# Patient Record
Sex: MALE | Race: WHITE
[De-identification: names, ages, dates, MRNs, and addresses within clinical notes are randomized per-mention and may not be internally consistent; named-entity substitution may affect disease eponyms.]

---

## 2017-06-07 ENCOUNTER — HOSPITAL ENCOUNTER (OUTPATIENT)
Dept: HOSPITAL 62 - ER | Age: 57
Setting detail: OBSERVATION
LOS: 3 days | Discharge: HOME | End: 2017-06-10
Attending: SURGERY
Payer: COMMERCIAL

## 2017-06-07 DIAGNOSIS — Z90.49: ICD-10-CM

## 2017-06-07 DIAGNOSIS — Z92.3: ICD-10-CM

## 2017-06-07 DIAGNOSIS — Z92.21: ICD-10-CM

## 2017-06-07 DIAGNOSIS — Z80.9: ICD-10-CM

## 2017-06-07 DIAGNOSIS — Z85.048: ICD-10-CM

## 2017-06-07 DIAGNOSIS — Z85.038: ICD-10-CM

## 2017-06-07 DIAGNOSIS — K50.00: Primary | ICD-10-CM

## 2017-06-07 DIAGNOSIS — K76.0: ICD-10-CM

## 2017-06-07 LAB
ALBUMIN SERPL-MCNC: 4.7 G/DL (ref 3.5–5)
ALP SERPL-CCNC: 76 U/L (ref 38–126)
ALT SERPL-CCNC: 52 U/L (ref 21–72)
ANION GAP SERPL CALC-SCNC: 13 MMOL/L (ref 5–19)
APPEARANCE UR: CLEAR
AST SERPL-CCNC: 31 U/L (ref 17–59)
BASOPHILS # BLD AUTO: 0 10^3/UL (ref 0–0.2)
BASOPHILS NFR BLD AUTO: 0.3 % (ref 0–2)
BILIRUB DIRECT SERPL-MCNC: 0.2 MG/DL (ref 0–0.4)
BILIRUB SERPL-MCNC: 0.9 MG/DL (ref 0.2–1.3)
BILIRUB UR QL STRIP: NEGATIVE
BUN SERPL-MCNC: 28 MG/DL (ref 7–20)
CALCIUM: 10 MG/DL (ref 8.4–10.2)
CHLORIDE SERPL-SCNC: 106 MMOL/L (ref 98–107)
CO2 SERPL-SCNC: 24 MMOL/L (ref 22–30)
CREAT SERPL-MCNC: 0.79 MG/DL (ref 0.52–1.25)
EOSINOPHIL # BLD AUTO: 0 10^3/UL (ref 0–0.6)
EOSINOPHIL NFR BLD AUTO: 0.1 % (ref 0–6)
ERYTHROCYTE [DISTWIDTH] IN BLOOD BY AUTOMATED COUNT: 14.1 % (ref 11.5–14)
GLUCOSE SERPL-MCNC: 93 MG/DL (ref 75–110)
GLUCOSE UR STRIP-MCNC: NEGATIVE MG/DL
HCT VFR BLD CALC: 48.9 % (ref 37.9–51)
HGB BLD-MCNC: 16.3 G/DL (ref 13.5–17)
HGB HCT DIFFERENCE: 0
KETONES UR STRIP-MCNC: NEGATIVE MG/DL
LIPASE SERPL-CCNC: 148.1 U/L (ref 23–300)
LYMPHOCYTES # BLD AUTO: 1.3 10^3/UL (ref 0.5–4.7)
LYMPHOCYTES NFR BLD AUTO: 9 % (ref 13–45)
MCH RBC QN AUTO: 28.6 PG (ref 27–33.4)
MCHC RBC AUTO-ENTMCNC: 33.4 G/DL (ref 32–36)
MCV RBC AUTO: 86 FL (ref 80–97)
MONOCYTES # BLD AUTO: 1 10^3/UL (ref 0.1–1.4)
MONOCYTES NFR BLD AUTO: 7.1 % (ref 3–13)
NEUTROPHILS # BLD AUTO: 11.8 10^3/UL (ref 1.7–8.2)
NEUTS SEG NFR BLD AUTO: 83.5 % (ref 42–78)
NITRITE UR QL STRIP: NEGATIVE
PH UR STRIP: 5 [PH] (ref 5–9)
POTASSIUM SERPL-SCNC: 4.4 MMOL/L (ref 3.6–5)
PROT SERPL-MCNC: 7.9 G/DL (ref 6.3–8.2)
PROT UR STRIP-MCNC: NEGATIVE MG/DL
RBC # BLD AUTO: 5.71 10^6/UL (ref 4.35–5.55)
SODIUM SERPL-SCNC: 142.7 MMOL/L (ref 137–145)
SP GR UR STRIP: 1.03
UROBILINOGEN UR-MCNC: NEGATIVE MG/DL (ref ?–2)
WBC # BLD AUTO: 14.1 10^3/UL (ref 4–10.5)

## 2017-06-07 PROCEDURE — 96361 HYDRATE IV INFUSION ADD-ON: CPT

## 2017-06-07 PROCEDURE — 81001 URINALYSIS AUTO W/SCOPE: CPT

## 2017-06-07 PROCEDURE — 85025 COMPLETE CBC W/AUTO DIFF WBC: CPT

## 2017-06-07 PROCEDURE — 99285 EMERGENCY DEPT VISIT HI MDM: CPT

## 2017-06-07 PROCEDURE — 96375 TX/PRO/DX INJ NEW DRUG ADDON: CPT

## 2017-06-07 PROCEDURE — 83690 ASSAY OF LIPASE: CPT

## 2017-06-07 PROCEDURE — 36415 COLL VENOUS BLD VENIPUNCTURE: CPT

## 2017-06-07 PROCEDURE — 80053 COMPREHEN METABOLIC PANEL: CPT

## 2017-06-07 PROCEDURE — 76705 ECHO EXAM OF ABDOMEN: CPT

## 2017-06-07 PROCEDURE — G0378 HOSPITAL OBSERVATION PER HR: HCPCS

## 2017-06-07 PROCEDURE — 96374 THER/PROPH/DIAG INJ IV PUSH: CPT

## 2017-06-07 PROCEDURE — 74176 CT ABD & PELVIS W/O CONTRAST: CPT

## 2017-06-07 NOTE — ER DOCUMENT REPORT
ED Medical Screen (RME)





- General


Chief Complaint: Abdominal Pain >50


Stated Complaint: ABDOMINAL PAIN


Time Seen by Provider: 06/07/17 18:12


Notes: 


Patient is a 56-year-old male, past medical history rectal cancer s/p cancer/

radiation/chemo 9 years ago (now "cured"), presents with 2 hours of 

intermittent right upper quadrant abdominal pain radiating into his RLQ.





PE: NAD. RRR. Lungs CTAB. RUQ abdominal tenderness.





I have greeted and performed a rapid initial assessment of this patient.  A 

comprehensive ED assessment and evaluation of the patient, analysis of test 

results and completion of the medical decision making process will be conducted 

by additional ED providers.


TRAVEL OUTSIDE OF THE [place] IN LAST 30 DAYS: No





- Related Data


Allergies/Adverse Reactions: 


 





No Known Allergies Allergy (Verified 06/07/17 18:18)


 











Past Medical History





- Social History


Family history: Malignancy


Renal/ Medical History: Denies: Hx Peritoneal Dialysis


Malignancy Medical History: Reports Hx Colorectal Cancer


Past Surgical History: Reports: Hx Abdominal Surgery - COLON Ca RESECTED





- Immunizations


Hx Diphtheria, Pertussis, Tetanus Vaccination: No





Physical Exam





- Vital signs


Vitals: 


 











Temp Pulse Resp BP Pulse Ox


 


 97.5 F   70   20   142/73 H  96 


 


 06/07/17 18:08  06/07/17 18:08  06/07/17 18:08  06/07/17 18:08  06/07/17 18:08














Course





- Vital Signs


Vital signs: 


 











Temp Pulse Resp BP Pulse Ox


 


 97.5 F   70   20   142/73 H  96 


 


 06/07/17 18:08  06/07/17 18:08  06/07/17 18:08  06/07/17 18:08  06/07/17 18:08

## 2017-06-07 NOTE — RADIOLOGY REPORT (SQ)
EXAM DESCRIPTION:  U/S ABDOMEN LIMITED W/O DOP



COMPLETED DATE/TIME:  6/7/2017 7:22 pm



REASON FOR STUDY:  RUQ pain and tenderness



COMPARISON:  None.



TECHNIQUE:  Dynamic and static grayscale images acquired of the right upper quadrant and recorded on 
PACS. Additional selected color Doppler and spectral images recorded.



LIMITATIONS:  Study limited due to acoustical interference from fat or from air in the bowel.



FINDINGS:  PANCREAS: Parts or all of the pancreas poorly seen secondary to acoustical interference fr
om fat or from air in the bowel.

LIVER: Echotexture is coarse with increased echogenicity consistent with fatty infiltration.  No mass
es.

LIVER VASCULATURE: Normal directional flow of the main portal vein and hepatic veins.

GALLBLADDER: No stones. Normal wall thickness. No pericholecystic fluid.

ULTRASOUND-DETECTED ELMORE'S SIGN: Reported positive.

INTRAHEPATIC DUCTS AND COMMON DUCT: CBD and intrahepatic ducts normal caliber. No filling defects.

INFERIOR VENA CAVA: Normal flow.

AORTA: No aneurysm.

RIGHT KIDNEY: Normal size. Normal echogenicity. No solid or suspicious masses. No hydronephrosis. No 
calcifications.

PERITONEAL CAVITY AND RIGHT PLEURAL SPACE: No ascites or effusions.

OTHER: No other significant finding.



IMPRESSION:  ULTRASOUND-DETECTED ELMORE'S SIGN: Reported positive.

No gallstones or inflammatory changes of gallbladder wall/fossa.

FATTY LIVER. PANCREAS PARTIALLY OR COMPLETELY OBSCURED.



TECHNICAL DOCUMENTATION:  JOB ID:  [id number]

 2011 [employer]- All Rights Reserved

## 2017-06-07 NOTE — RADIOLOGY REPORT (SQ)
EXAM DESCRIPTION:  CT ABD/PELVIS ORAL ONLY



COMPLETED DATE/TIME:  6/7/2017 9:52 pm



REASON FOR STUDY:  R flank, LQ pain



COMPARISON:  None.



TECHNIQUE:  CT scan of the abdomen and pelvis performed without intravenous or oral contrast. Images 
reviewed with lung, soft tissue, and bone windows. Reconstructed coronal and sagittal MPR images revi
ewed. All images stored on PACS.

All CT scanners at this facility use dose modulation, iterative reconstruction, and/or weight based d
osing when appropriate to reduce radiation dose to as low as reasonably achievable (ALARA).

[facility]: Dose Right  [facility]: CareDose    [facility]: Dose Right    [facility]: Teradose 4D    [facility]: Smart Technologies



RADIATION DOSE:  12.25mGy.



LIMITATIONS:  None.



FINDINGS:  LOWER CHEST: No significant findings. No nodules or infiltrates.

NON-CONTRASTED LIVER, SPLEEN, ADRENALS: Evaluation limited by lack of IV contrast. No identified sign
ificant masses.

PANCREAS: No masses. No peripancreatic inflammatory changes.

GALLBLADDER: No identified stones by CT criteria. No inflammatory changes to suggest cholecystitis.

RIGHT KIDNEY AND URETER: No suspicious masses. Assessment limited by lack of IV contrast.   No signif
icant calcifications.   No hydronephrosis or hydroureter.

LEFT KIDNEY AND URETER: No suspicious masses. Assessment limited by lack of IV contrast.   No signifi
cant calcifications.   No hydronephrosis or hydroureter.

AORTA AND RETROPERITONEUM: No aneurysm. No retroperitoneal masses or adenopathy.

BOWEL AND PERITONEAL CAVITY: Small amount of mesenteric inflammatory changes adjacent to some distal 
ileal loops, nonspecific.  No focal transition point or evidence of obstruction.  Small amount of pel
severino free fluid.

APPENDIX: Normal.

PELVIS, BLADDER, AND ABDOMINAL WALL:Postsurgical changes in the rectum.  Small amount of pelvic free 
fluid.. Bladder normal.

BONES: No acute findings.  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

OTHER: No other significant finding.



IMPRESSION:  Small amount of mesenteric inflammatory changes adjacent to some distal ileal loops, non
specific.  No focal transition point or evidence of obstruction.  Small amount of pelvic free fluid.



TECHNICAL DOCUMENTATION:  JOB ID:  [id number]

Quality ID # [id number]: Final reports with documentation of one or more dose reduction techniques (e.g., Au
tomated exposure control, adjustment of the mA and/or kV according to patient size, use of iterative 
reconstruction technique)

 2011 [employer]- All Rights Reserved

## 2017-06-07 NOTE — ER DOCUMENT REPORT
ED GI/





- General


Mode of Arrival: Ambulatory


Information source: Patient


TRAVEL OUTSIDE OF THE [place] IN LAST 30 DAYS: No





- HPI


Patient complains to provider of: Abdominal pain


Associated symptoms: Other - See above





<KARTIK CARDOZA - Last Filed: 06/07/17 21:46>





<NARICSO GALICIA - Last Filed: 06/08/17 01:00>





- General


Chief Complaint: Abdominal Pain >50


Stated Complaint: ABDOMINAL PAIN


Time Seen by Provider: 06/07/17 18:12


Notes: 


Patient is a 56 year old male, with a past medical history including rectal 

cancer, who presents to the emergency department complaining of abdominal pain 

on set 2 hours ago. Patient reports the pain came on gradually and is focused 

in his right lower quadrant. Patient also complains of mild nausea.  (KARTIK CARDOZA)





- Related Data


Allergies/Adverse Reactions: 


 





No Known Allergies Allergy (Verified 06/07/17 18:18)


 











Past Medical History





- General


Information source: Patient





- Social History


Smoking Status: Never Smoker


Chew tobacco use (# tins/day): No


Frequency of alcohol use: None


Drug Abuse: None


Family History: Reviewed & Not Pertinent


Patient has suicidal ideation: No


Patient has homicidal ideation: No


Malignancy Medical History: Reports Hx Colorectal Cancer


Past Surgical History: Reports: Hx Abdominal Surgery - COLON Ca RESECTED





- Immunizations


Hx Diphtheria, Pertussis, Tetanus Vaccination: No





<KARTIK CARDOZA - Last Filed: 06/07/17 21:46>





Review of Systems





- Review of Systems


Constitutional: No symptoms reported


EENT: No symptoms reported


Cardiovascular: No symptoms reported


Respiratory: No symptoms reported


Gastrointestinal: See HPI, Abdominal pain, Nausea


Genitourinary: No symptoms reported


Male Genitourinary: No symptoms reported


Musculoskeletal: No symptoms reported


Skin: No symptoms reported


Hematologic/Lymphatic: No symptoms reported


Neurological/Psychological: No symptoms reported


-: Yes All other systems reviewed and negative





<KARTIK CARDOZA - Last Filed: 06/07/17 21:46>





Physical Exam





- Vital signs


Interpretation: Normal





- General


General appearance: Alert, Other - Appears uncomfortable





- HEENT


Head: Normocephalic, Atraumatic


Mucous membranes: Dry





- Respiratory


Respiratory status: No respiratory distress


Chest status: Nontender


Breath sounds: Normal


Chest palpation: Normal





- Cardiovascular


Rhythm: Regular


Heart sounds: Normal auscultation


Murmur: No





- Abdominal


Inspection: Normal


Distension: No distension


Bowel sounds: Normal


Tenderness: Tender - Right lower quadrant tenderness to palpation, Guarding.  No

: Rebound


Organomegaly: No organomegaly





- Extremities


General upper extremity: Normal inspection


General lower extremity: Normal inspection





- Neurological


Neuro grossly intact: Yes


Cognition: Normal


Orientation: AAOx4


[place] Coma Scale Eye Opening: Spontaneous


[place] Coma Scale Verbal: Oriented


[place] Coma Scale Motor: Obeys Commands


[place] Coma Scale Total: 15


Speech: Normal





- Psychological


Associated symptoms: Normal affect, Normal mood





- Skin


Skin Temperature: Warm


Skin Moisture: Dry


Skin Color: Normal





<KARTIK CARDOZA - Last Filed: 06/07/17 21:46>





Course





- Laboratory


Result Diagrams: 


 06/07/17 18:27





 06/07/17 18:27





<KARTIK CARDOZA - Last Filed: 06/07/17 21:46>





- Laboratory


Result Diagrams: 


 06/07/17 18:27





 06/07/17 18:27





- Diagnostic Test


Radiology reviewed: Reports reviewed





<NARCISO GALICIA - Last Filed: 06/08/17 01:00>





- Re-evaluation


Re-evalutation: 





06/08/17 00:59


Continues with right lower quadrant pain and guarding on exam.  No evidence for 

appendicitis on CT at this time.  Patient does have a white count left shift.  

He is still complaining of pain.  Surgery has been consulted and will admit the 

patient.  He is stable at this time and agrees with this plan. (NARCISO GALICIA)





- Vital Signs


Vital signs: 





 











Temp Pulse Resp BP Pulse Ox


 


 97.5 F   70   20   142/73 H  96 


 


 06/07/17 18:08  06/07/17 18:08  06/07/17 18:08  06/07/17 18:08  06/07/17 18:08














- Laboratory


Laboratory results interpreted by me: 





 











  06/07/17 06/07/17 06/07/17





  18:27 18:27 18:27


 


WBC  14.1 H  


 


RBC  5.71 H  


 


RDW  14.1 H  


 


Seg Neutrophils %  83.5 H  


 


Lymphocytes %  9.0 L  


 


Absolute Neutrophils  11.8 H  


 


BUN   28 H 


 


Urine Blood    SMALL H














Discharge





<KARTIK CARDOZA - Last Filed: 06/07/17 21:46>





- Discharge


Admitting Provider: Surgicalist - [facility]


Unit Admitted: Surgical Floor





<NARCISO GALICIA - Last Filed: 06/08/17 01:00>





- Discharge


Clinical Impression: 


Abdominal pain


Qualifiers:


 Abdominal location: right lower quadrant Qualified Code(s): R10.31 - Right 

lower quadrant pain





Condition: Stable


Disposition: ADMITTED AS INPATIENT


Scribe Attestation: 





06/08/17 01:00


I personally performed the services described in the documentation, reviewed 

and edited the documentation which was dictated to the scribe in my presence, 

and it accurately records my words and actions. (NARCISO GALICIA)





Scribe Documentation





- Scribe


Written by Ced:: ced Loredo, 6/7/17, 2148


acting as scribe for :: Catherine





<KARTIK CARDOZA - Last Filed: 06/07/17 21:46>

## 2017-06-08 LAB
BASOPHILS # BLD AUTO: 0.1 10^3/UL (ref 0–0.2)
BASOPHILS NFR BLD AUTO: 0.6 % (ref 0–2)
EOSINOPHIL # BLD AUTO: 0.1 10^3/UL (ref 0–0.6)
EOSINOPHIL NFR BLD AUTO: 0.6 % (ref 0–6)
ERYTHROCYTE [DISTWIDTH] IN BLOOD BY AUTOMATED COUNT: 14.1 % (ref 11.5–14)
HCT VFR BLD CALC: 44.5 % (ref 37.9–51)
HGB BLD-MCNC: 14.8 G/DL (ref 13.5–17)
HGB HCT DIFFERENCE: -0.1
LYMPHOCYTES # BLD AUTO: 1.1 10^3/UL (ref 0.5–4.7)
LYMPHOCYTES NFR BLD AUTO: 10.9 % (ref 13–45)
MCH RBC QN AUTO: 28.9 PG (ref 27–33.4)
MCHC RBC AUTO-ENTMCNC: 33.2 G/DL (ref 32–36)
MCV RBC AUTO: 87 FL (ref 80–97)
MONOCYTES # BLD AUTO: 1.1 10^3/UL (ref 0.1–1.4)
MONOCYTES NFR BLD AUTO: 10.9 % (ref 3–13)
NEUTROPHILS # BLD AUTO: 8 10^3/UL (ref 1.7–8.2)
NEUTS SEG NFR BLD AUTO: 77 % (ref 42–78)
RBC # BLD AUTO: 5.11 10^6/UL (ref 4.35–5.55)
WBC # BLD AUTO: 10.4 10^3/UL (ref 4–10.5)

## 2017-06-08 RX ADMIN — DEXAMETHASONE SODIUM PHOSPHATE PRN MG: 10 INJECTION INTRAMUSCULAR; INTRAVENOUS at 11:08

## 2017-06-08 RX ADMIN — METRONIDAZOLE SCH ML: 500 INJECTION, SOLUTION INTRAVENOUS at 14:20

## 2017-06-08 RX ADMIN — METRONIDAZOLE SCH ML: 500 INJECTION, SOLUTION INTRAVENOUS at 09:06

## 2017-06-08 RX ADMIN — ENOXAPARIN SODIUM SCH MG: 40 INJECTION SUBCUTANEOUS at 07:54

## 2017-06-08 RX ADMIN — DEXAMETHASONE SODIUM PHOSPHATE PRN MG: 10 INJECTION INTRAMUSCULAR; INTRAVENOUS at 19:21

## 2017-06-08 RX ADMIN — DEXAMETHASONE SODIUM PHOSPHATE PRN MG: 10 INJECTION INTRAMUSCULAR; INTRAVENOUS at 15:09

## 2017-06-08 RX ADMIN — METRONIDAZOLE SCH ML: 500 INJECTION, SOLUTION INTRAVENOUS at 20:55

## 2017-06-08 RX ADMIN — MORPHINE SULFATE PRN MG: 10 INJECTION INTRAMUSCULAR; INTRAVENOUS; SUBCUTANEOUS at 14:23

## 2017-06-08 RX ADMIN — MORPHINE SULFATE PRN MG: 10 INJECTION INTRAMUSCULAR; INTRAVENOUS; SUBCUTANEOUS at 19:21

## 2017-06-08 RX ADMIN — CIPROFLOXACIN SCH ML: 2 INJECTION, SOLUTION INTRAVENOUS at 22:06

## 2017-06-08 RX ADMIN — CIPROFLOXACIN SCH ML: 2 INJECTION, SOLUTION INTRAVENOUS at 10:49

## 2017-06-08 NOTE — PDOC H&P
History of Present Illness


Admission Date/PCP: 


  06/08/17 01:15





  





Patient complains of: Right lower quadrant pain


History of Present Illness: 


PREET COLE is a 56 year old male, who was in his usual state of health, 

when he developed gradual onset of right lower quadrant pain with associated 

nausea without vomiting, without change in bowel habits, fever or chills, or 

history of previous episodes.  The pain was initially 6 out of 10 in nature, 

which prompted him to come to the emergency room.  Patient abdomen revealed 

right lower quadrant tenderness, with guarding but no rebound.  Patient was 

noted to have a mildly elevated white blood cell count at 14,000, and a CT scan 

showed mesenteric inflammatory changes around the distal ileum, with a normal 

appendix.  The impression was that this was an ileitis, and the patient is now 

admitted to the surgical service.








Past Medical History


Malignancy Medical History: Reports: Colorectal Cancer





Social History


Smoking Status: Never Smoker





Family History


Family History: Reviewed & Not Pertinent


Parental Family History Reviewed: No - Not applicable


Children Family History Reviewed: No - Not applicable


Sibling(s) Family History Reviewed.: No - Not applicable





Medication/Allergy


Home Medications: 








Doxycycline Hyclate 100 mg PO BID #14 capsule 08/03/16 


Ondansetron [Zofran Odt 4 mg Tablet] 1 - 2 tab PO Q4H PRN #15 tab.rapdis 08/03/ 16 


Meclizine HCl [Antivert 25 mg Tablet] 25 mg PO Q6HP PRN #30 tablet 08/30/16 








Allergies/Adverse Reactions: 


 





No Known Allergies Allergy (Verified 06/07/17 18:18)


 











Physical Exam


Vital Signs: 


 











Temp Pulse Resp BP Pulse Ox


 


 97.5 F   70   20   142/73 H  96 


 


 06/07/17 18:08  06/07/17 18:08  06/07/17 18:08  06/07/17 18:08  06/07/17 18:08











General appearance: PRESENT: no acute distress, cooperative, thin, well-

developed, well-nourished


Head exam: PRESENT: atraumatic, normocephalic


Eye exam: PRESENT: conjunctiva pink, EOMI, PERRLA


Mouth exam: PRESENT: moist, neck supple, tongue midline


Neck exam: PRESENT: full ROM.  ABSENT: JVD, lymphadenopathy, tenderness, 

thyromegaly, tracheal deviation


Respiratory exam: PRESENT: clear to auscultation yusuf


Cardiovascular exam: PRESENT: RRR


GI/Abdominal exam: PRESENT: guarding - guarding is mild, normal bowel sounds, 

soft, tenderness - High in the right lower quadrant, approaching the right 

paramedian region., other - Infraumbilical midline scar.


Rectal exam: PRESENT: deferred


Extremities exam: PRESENT: full ROM


Musculoskeletal exam: PRESENT: full ROM


Skin exam: PRESENT: warm





Results


Impressions: 


 





Abdomen Ultrasound  06/07/17 18:23


IMPRESSION:  ULTRASOUND-DETECTED ELMORE'S SIGN: Reported positive.


No gallstones or inflammatory changes of gallbladder wall/fossa.


FATTY LIVER. PANCREAS PARTIALLY OR COMPLETELY OBSCURED.


 








Abdomen/Pelvis CT  06/07/17 21:16


IMPRESSION:  Small amount of mesenteric inflammatory changes adjacent to some 

distal ileal loops, nonspecific.  No focal transition point or evidence of 

obstruction.  Small amount of pelvic free fluid.


 














Assessment & Plan





- Plan Summary


Plan Summary: 


We will keep the patient n.p.o., administer IV fluids, antibiotics, parenteral 

analgesics, and anti-emetics.

## 2017-06-08 NOTE — PDOC H&P
History of Present Illness


Admission Date/PCP: 


  06/08/17 01:47





  





History of Present Illness: 


PREET COLE is a 56 year old male, who was in his usual state of health, 

when he developed gradual onset of right lower quadrant pain with associated 

nausea without vomiting, without change in bowel habits, fever or chills, or 

history of previous episodes.  The pain was initially 6 out of 10 in nature, 

which prompted him to come to the emergency room.  Patient abdomen revealed 

right lower quadrant tenderness, with guarding but no rebound.  Patient was 

noted to have a mildly elevated white blood cell count at 14,000, and a CT scan 

showed mesenteric inflammatory changes around the distal ileum, with a normal 

appendix.  The impression was that this was an ileitis, and the patient is now 

admitted to the surgical service.  Patient has been requested for ileitis.








Past Medical History


Past Medical History: 


Rectal cancer stage III status post chemotherapy radiation and surgical 

resection


Malignancy Medical History: Reports: Colorectal Cancer





Past Surgical History


Past Surgical History: Reports: Other - Colectomy





Social History


Smoking Status: Never Smoker


Frequency of Alcohol Use: None


Hx Recreational Drug Use: No


Hx Prescription Drug Abuse: No





- Advance Directive


Resuscitation Status: Full Code


Surrogate healthcare decision maker:: 


Cuate zazueta





Family History


Family History: Malignancy


Parental Family History Reviewed: Yes


Children Family History Reviewed: Yes


Sibling(s) Family History Reviewed.: Yes





Medication/Allergy


Home Medications: 








Cyanocobalamin (Vitamin B-12) [Vitamin B-12] 2,000 mcg PO QHS 06/08/17 








Allergies/Adverse Reactions: 


 





No Known Allergies Allergy (Verified 06/07/17 18:18)


 











Review of Systems


Constitutional: PRESENT: anorexia.  ABSENT: chills, fever(s), headache(s), 

weight gain, weight loss


Eyes: ABSENT: visual disturbances


Ears: ABSENT: hearing changes


Cardiovascular: ABSENT: chest pain, dyspnea on exertion, edema, orthropnea, 

palpitations


Respiratory: ABSENT: cough, hemoptysis


Gastrointestinal: PRESENT: abdominal pain, nausea.  ABSENT: constipation, 

diarrhea, dysphagia, hematemesis, hematochezia, melena, vomiting


Genitourinary: ABSENT: dysuria, hematuria


Musculoskeletal: ABSENT: joint swelling


Integumentary: ABSENT: rash, wounds


Neurological: ABSENT: abnormal gait, abnormal speech, confusion, dizziness, 

focal weakness, syncope


Psychiatric: ABSENT: anxiety, depression, homidical ideation, suicidal ideation


Endocrine: ABSENT: cold intolerance, heat intolerance, polydipsia, polyuria


Hematologic/Lymphatic: ABSENT: easy bleeding, easy bruising





Physical Exam


Vital Signs: 


 











Temp Pulse Resp BP Pulse Ox


 


 97.5 F   58 L  14   123/80   95 


 


 06/08/17 12:27  06/08/17 12:27  06/08/17 12:27  06/08/17 12:27  06/08/17 12:27








 Intake & Output











 06/07/17 06/08/17 06/09/17





 06:59 06:59 06:59


 


Output Total   500


 


Balance   -500











General appearance: PRESENT: mild distress, well-developed, well-nourished


Head exam: PRESENT: atraumatic, normocephalic


Eye exam: PRESENT: conjunctiva pink, EOMI, PERRLA.  ABSENT: scleral icterus


Ear exam: PRESENT: normal external ear exam


Mouth exam: PRESENT: dry mucosa, tongue midline


Neck exam: ABSENT: JVD, lymphadenopathy, thyromegaly, tracheal deviation


Respiratory exam: PRESENT: clear to auscultation yusuf.  ABSENT: rales, rhonchi, 

wheezes


Cardiovascular exam: PRESENT: RRR, +S1, +S2.  ABSENT: diastolic murmur, rubs, 

systolic murmur, tachycardia


Pulses: PRESENT: normal dorsalis pedis pul


Vascular exam: PRESENT: normal capillary refill


GI/Abdominal exam: PRESENT: distended, guarding, hypoactive bowel sounds, soft.

  ABSENT: firm, mass, organolmegaly, rebound, rigid, tenderness


Rectal exam: PRESENT: deferred


Extremities exam: PRESENT: full ROM.  ABSENT: calf tenderness, clubbing, pedal 

edema


Neurological exam: PRESENT: alert, awake, oriented to person, oriented to place

, oriented to time, oriented to situation, CN II-XII grossly intact.  ABSENT: 

motor sensory deficit


Psychiatric exam: PRESENT: appropriate affect, normal mood.  ABSENT: homicidal 

ideation, suicidal ideation


Skin exam: PRESENT: dry, intact, warm.  ABSENT: cyanosis, rash





Results


Laboratory Results: 


 





 06/08/17 06:15 





 











  06/08/17





  06:15


 


WBC  10.4


 


RBC  5.11


 


Hgb  14.8


 


Hct  44.5


 


MCV  87


 


MCH  28.9


 


MCHC  33.2


 


RDW  14.1 H


 


Plt Count  210


 


Seg Neutrophils %  77.0


 


Lymphocytes %  10.9 L


 


Monocytes %  10.9


 


Eosinophils %  0.6


 


Basophils %  0.6


 


Absolute Neutrophils  8.0


 


Absolute Lymphocytes  1.1


 


Absolute Monocytes  1.1


 


Absolute Eosinophils  0.1


 


Absolute Basophils  0.1











Impressions: 


 





Abdomen Ultrasound  06/07/17 18:23


IMPRESSION:  ULTRASOUND-DETECTED ELMORE'S SIGN: Reported positive.


No gallstones or inflammatory changes of gallbladder wall/fossa.


FATTY LIVER. PANCREAS PARTIALLY OR COMPLETELY OBSCURED.


 








Abdomen/Pelvis CT  06/07/17 21:16


IMPRESSION:  Small amount of mesenteric inflammatory changes adjacent to some 

distal ileal loops, nonspecific.  No focal transition point or evidence of 

obstruction.  Small amount of pelvic free fluid.


 














Assessment & Plan





- Diagnosis


(1) Terminal ileitis of small intestine


Qualifiers: 


     Digestive disease complication type: without complication        Qualified 

Code(s): K50.00 - Crohn's disease of small intestine without complications  


Is this a current diagnosis for this admission?: YesPlan: 





Differential for this includes bacterial infection including Shigella, E. coli, 

Campylobacter, Salmonella, Yersinia, tuberculosis, C. difficile, vibrio, and 

chlamydia in addition to parasites and cytomegalovirus.  Although not yet ruled 

out, I am disinclined to these diagnoses secondary to his lack of diarrhea. 

Stool studies if patient does begin to have diarrhea.


Additionally radiation, although remote, may also in this type of presentation.

  More commonly, radiation colitis is left-sided.


Consideration should also be given to NSAID ileitis.


At this time, although grateful for the consult I recommend a GI evaluation and 

we will sign off of this case and are available for any other medical concerns.


This was discussed with the primary team, Dr. Alberto.








(2) History of colorectal cancer


Is this a current diagnosis for this admission?: YesPlan: 


Patient has a personal history of stage III colorectal cancer in 2009 which was 

treated with chemotherapy, radiation, and surgical intervention.  Patient 

reports his last colonoscopy was approximately 2 years ago.  











- Time


Time Spent: 50 to 70 Minutes


Medications reviewed and adjusted accordingly: Yes

## 2017-06-08 NOTE — PDOC PROGRESS REPORT
Subjective


Progress Note for:: 06/08/17


Subjective:: 


Feel better.  Abdominal pain has not resolved but has improved since admission.

  Had bowel movements yesterday.





Physical Exam


Vital Signs: 


 











Temp Pulse Resp BP Pulse Ox


 


 97.4 F   52 L  18   122/89 H  99 


 


 06/08/17 05:49  06/08/17 05:49  06/08/17 05:49  06/08/17 05:49  06/08/17 05:49











General appearance: PRESENT: no acute distress, cooperative


Respiratory exam: PRESENT: clear to auscultation yusuf


Cardiovascular exam: PRESENT: RRR


GI/Abdominal exam: PRESENT: other - Soft, very mild right lower quadrant 

abdominal tenderness with no peritoneal signs.  Well-healed lower midline 

abdominal scar.  No palpable hernia defects.


Extremities exam: PRESENT: other - No swelling





Results


Laboratory Results: 


 





 06/08/17 06:15 





 











  06/08/17





  06:15


 


WBC  10.4


 


RBC  5.11


 


Hgb  14.8


 


Hct  44.5


 


MCV  87


 


MCH  28.9


 


MCHC  33.2


 


RDW  14.1 H


 


Plt Count  210


 


Seg Neutrophils %  77.0


 


Lymphocytes %  10.9 L


 


Monocytes %  10.9


 


Eosinophils %  0.6


 


Basophils %  0.6


 


Absolute Neutrophils  8.0


 


Absolute Lymphocytes  1.1


 


Absolute Monocytes  1.1


 


Absolute Eosinophils  0.1


 


Absolute Basophils  0.1











Impressions: 


 





Abdomen Ultrasound  06/07/17 18:23


IMPRESSION:  ULTRASOUND-DETECTED ELMORE'S SIGN: Reported positive.


No gallstones or inflammatory changes of gallbladder wall/fossa.


FATTY LIVER. PANCREAS PARTIALLY OR COMPLETELY OBSCURED.


 








Abdomen/Pelvis CT  06/07/17 21:16


IMPRESSION:  Small amount of mesenteric inflammatory changes adjacent to some 

distal ileal loops, nonspecific.  No focal transition point or evidence of 

obstruction.  Small amount of pelvic free fluid.


 














Assessment & Plan





- Diagnosis


(1) Terminal ileitis of small intestine


Qualifiers: 


     Digestive disease complication type: without complication        Qualified 

Code(s): K50.00 - Crohn's disease of small intestine without complications  


Is this a current diagnosis for this admission?: YesPlan: 


Responding to antibiotics.  Will start clear liquids.  No gastroenterology 

available for consultation.  Will obtain hospitalist consult.

## 2017-06-09 RX ADMIN — METRONIDAZOLE SCH ML: 500 INJECTION, SOLUTION INTRAVENOUS at 08:16

## 2017-06-09 RX ADMIN — DEXAMETHASONE SODIUM PHOSPHATE PRN MG: 10 INJECTION INTRAMUSCULAR; INTRAVENOUS at 02:41

## 2017-06-09 RX ADMIN — DEXAMETHASONE SODIUM PHOSPHATE PRN MG: 10 INJECTION INTRAMUSCULAR; INTRAVENOUS at 19:52

## 2017-06-09 RX ADMIN — CIPROFLOXACIN SCH ML: 2 INJECTION, SOLUTION INTRAVENOUS at 22:15

## 2017-06-09 RX ADMIN — ENOXAPARIN SODIUM SCH MG: 40 INJECTION SUBCUTANEOUS at 08:16

## 2017-06-09 RX ADMIN — METRONIDAZOLE SCH ML: 500 INJECTION, SOLUTION INTRAVENOUS at 02:40

## 2017-06-09 RX ADMIN — DEXAMETHASONE SODIUM PHOSPHATE PRN MG: 10 INJECTION INTRAMUSCULAR; INTRAVENOUS at 06:47

## 2017-06-09 RX ADMIN — CIPROFLOXACIN SCH ML: 2 INJECTION, SOLUTION INTRAVENOUS at 09:38

## 2017-06-09 RX ADMIN — METRONIDAZOLE SCH ML: 500 INJECTION, SOLUTION INTRAVENOUS at 14:12

## 2017-06-09 RX ADMIN — METRONIDAZOLE SCH ML: 500 INJECTION, SOLUTION INTRAVENOUS at 21:02

## 2017-06-09 NOTE — PROGRESS NOTE E
Progress Note



NAME: PREET COLE

MRN: [id number]

: 1960             AGE: 56Y

DATE:  2017          ROOM: 435



SUBJECTIVE:

The patient's abdominal pain appears to have decreased quite a bit, but he

is still having some nausea.



OBJECTIVE:

His abdomen is soft.  He has mild tenderness on the right lower quadrant. 

His white count yesterday morning was around 14.1.



PLAN:

1.  Gradually increase his diet to full liquids today and progress to soft

diet in the morning hopefully prior to being discharged.

2.  He is being followed up by GI and oncology in [place], and the last

colonoscopy was about 3 years ago.  They said they will follow up with his

GI in [place] for possible followup colonoscopy.

3.  We will discharge in 24-48 hours and continue on p.o. antibiotics for

about a week.







DICTATING PHYSICIAN:  ALESSANDRO GONZALEZ M.D.





1272M                  DT: [id number]

PHY#: [id number]            DD: 2017

ID:   [id number]           JOB#: [id number]       ACCT: [id number]



cc:

>

## 2017-06-10 VITALS — DIASTOLIC BLOOD PRESSURE: 66 MMHG | SYSTOLIC BLOOD PRESSURE: 106 MMHG

## 2017-06-10 RX ADMIN — METRONIDAZOLE SCH ML: 500 INJECTION, SOLUTION INTRAVENOUS at 03:20

## 2017-06-10 RX ADMIN — METRONIDAZOLE SCH ML: 500 INJECTION, SOLUTION INTRAVENOUS at 08:30

## 2017-06-10 RX ADMIN — ENOXAPARIN SODIUM SCH MG: 40 INJECTION SUBCUTANEOUS at 08:30

## 2017-06-10 NOTE — DISCHARGE SUMMARY E
Discharge Summary



NAME: PREET COLE

MRN:  [id number]        : 1960     AGE: 56Y

ADMITTED: 2017                  DISCHARGED: 06/10/2017



FINAL DIAGNOSIS:

Terminal ileitis.



HOSPITAL COURSE:

This is a 56-year-old male with history of colon cancer post surgery with

adjuvant chemotherapy and radiation therapy.  Patient admitted for

abdominal pain and CAT scan revealed inflammation on the area of terminal

ileum.  His white count on admission was 14.1 and then it decreased to

10.4 the next day.  He was able to tolerate liquids yesterday though with

some nausea.  This morning his pains are much less and his abdomen is soft

with minimal tenderness in the right lower quadrant.  He is afebrile and

he is tolerating soft diet.  He will be discharged today with a diagnosis

of terminal ileitis on p.o. Flagyl and Cipro.  I have discussed his case

with the patient and his wife and they agree that they will get in touch

with his GI in [place] Monday.  I gave him a note also that he can go

back to work Monday and he should be continued on a soft diet until seen

by his GI.







DICTATING PHYSICIAN:  ALESSANDRO GONZALEZ M.D.





1211M                  DT: 06/10/2017    1008

PHY#: [id number]            DD: 06/10/2017    0932

ID:   [id number]           JOB#: [id number]       ACCT: [id number]



cc:[facility]  ALESSANDRO SOTO M.D, M.D.

   [facility]

>

## 2017-06-22 ENCOUNTER — HOSPITAL ENCOUNTER (OUTPATIENT)
Dept: HOSPITAL 62 - SP | Age: 57
End: 2017-06-22
Attending: FAMILY MEDICINE
Payer: COMMERCIAL

## 2017-06-22 DIAGNOSIS — M79.89: ICD-10-CM

## 2017-06-22 DIAGNOSIS — M79.652: Primary | ICD-10-CM

## 2017-06-22 PROCEDURE — 93971 EXTREMITY STUDY: CPT

## 2017-06-22 NOTE — XCELERA REPORT
[address] [address]

                             Tel: [phone]

                             Fax: [phone]



                     Lower Extremity Venous Evaluation

____________________________________________________________________________



Name: PREET COLE

MRN: [id number]                Age: 56 yrs

Gender: Male                   : 1960

Patient Status: Outpatient     Patient Location: 

Account #: [id number]

Study Date: 2017 01:00 PM

Accession #: [id number]

____________________________________________________________________________



Procedure: Color flow and duplex imaging of the veins of the left lower

extremity as well as the right Common Femoral vein.

Reason For Study: LEFT THIGH PAIN AND SWELLING M79.652





Ordering Physician: DEMARIO BROOKS

Performed By: Soledad Marie

____________________________________________________________________________



____________________________________________________________________________





Right Sided Venous Evaluation

The right common femoral vein is fully compressible. Spontaneous and phasic

flow is present in the right common femoral vein.



Left Sided Venous Evaluation

Normal vessel filling wall to wall, compression and augmentation as well as

Colour flow down to the infrageniculate veins.



Critical Findings

Called in at about 1500.

____________________________________________________________________________





Interpretation Summary

No duplex evidence of DVT or obstruction in the left lower extremity nor in

the right Common Femoral vein.

____________________________________________________________________________



____________________________________________________________________________



Electronically signed by:      Lennox Williams      on 2017 05:34 PM



CC: DEMARIO BROOKS

>

Williams, Lennox

## 2018-12-22 ENCOUNTER — HOSPITAL ENCOUNTER (EMERGENCY)
Dept: HOSPITAL 62 - ER | Age: 58
Discharge: HOME | End: 2018-12-22
Payer: COMMERCIAL

## 2018-12-22 VITALS — DIASTOLIC BLOOD PRESSURE: 81 MMHG | SYSTOLIC BLOOD PRESSURE: 115 MMHG

## 2018-12-22 DIAGNOSIS — Z85.048: ICD-10-CM

## 2018-12-22 DIAGNOSIS — L29.8: ICD-10-CM

## 2018-12-22 DIAGNOSIS — R21: Primary | ICD-10-CM

## 2018-12-22 PROCEDURE — 99283 EMERGENCY DEPT VISIT LOW MDM: CPT

## 2018-12-22 NOTE — ER DOCUMENT REPORT
ED General





- General


Chief Complaint: Rash


Stated Complaint: RASH ON BOTH FEET


Time Seen by Provider: 12/22/18 01:25


Notes: 





Patient is a 58-year-old male presents with complaint of a rash on his feet.  

Patient says that he noticed redness and dryness and itching on his feet.  He 

therefore has been putting rubbing alcohol on it and it became much worse.  He 

says he has one other small spot in his thigh.  He says it is nowhere near as 

bad.  He says he has not been placing alcohol on that area.  No fevers.  No 

vomiting.  Says he had similar rashes in the past but not to this extent.  He 

said he used to put lotion on his feet to keep them from becoming dry but 

recently stopped doing it and now this rash has appeared.  He does not wear 

boots.  He says he is normal tennis shoes and socks.


TRAVEL OUTSIDE OF THE [place] IN LAST 30 DAYS: No





- Related Data


Allergies/Adverse Reactions: 


                                        





No Known Allergies Allergy (Verified 06/07/17 18:18)


   











Past Medical History





- Social History


Smoking Status: Unknown if Ever Smoked


Frequency of alcohol use: None


Drug Abuse: None


Family History: Malignancy


Renal/ Medical History: Denies: Hx Peritoneal Dialysis


Malignancy Medical History: Reports Hx Colorectal Cancer


Past Surgical History: Reports: Hx Abdominal Surgery - COLON Ca RESECTED, Other 

- Colectomy





- Immunizations


Hx Diphtheria, Pertussis, Tetanus Vaccination: No





Review of Systems





- Review of Systems


Notes: 





My Normal Review Basic





REVIEW OF SYSTEMS:


CONSTITUTIONAL :  Denies fever,  chills, or sweats.  Denies recent illness.


GASTROINTESTINAL:  Denies abdominal pain.  Denies nausea, vomiting, or diarrhea.




MUSCULOSKELETAL:  Denies neck or back pain or joint pain or swelling.


SKIN: Redness and itching to both feet.


NEUROLOGICAL:   Denies sensory or motor loss.


ALL OTHER SYSTEMS REVIEWED AND NEGATIVE.





Physical Exam





- Vital signs


Vitals: 


                                        











Temp Pulse Resp BP Pulse Ox


 


 97.5 F   65   18   128/92 H  97 


 


 12/22/18 00:57  12/22/18 00:57  12/22/18 00:57  12/22/18 00:57  12/22/18 00:57














- Notes


Notes: 





General Appearance: Well nourished, alert, cooperative, no acute distress, no 

obvious discomfort.


Vitals: reviewed, See vital signs table.


Eyes: PERRL, EOMI, Conjuctiva clear


Extremities: strength 5/5 in all extremities, good pulses in all extremities, No

swelling to joints


Skin: Redness and dry skin starting at the ankles and going distally into the 

feet and toes.  No peeling of the skin.  There is some cracking of the skin 

consistent with eczema and dry skin.  No abnormal warmth.  Exam not consistent 

with cellulitis or infection.


Neuro: speech clear, oriented x 3, normal affect, responds appropriately to 

questions.





Course





- Re-evaluation


Re-evalutation: 





12/22/18 06:16


I suspect that the patient's redness in his feet is probably related to eczema 

very dry skin.  I talked to him and his wife at length about using a non-

perfumed non-dyed lotion such as CeraVe or Eucerin.  It sounds as if he had to 

use lotion in the past to deal with similar type issues.  I informed him that if

this is not working then he should add in the triamcinolone cream.  I told him 

to wait at least 4-5 days before considering adding in the try ampicillin cream.

 I do not suspect athlete's foot.  He does not have a scaling or peeling they 

typically see with athlete's foot.  Informed him to follow-up with his doctor 

this coming week.  I encouraged him return to ER for spreading or worsening of 

the rash or if he feels unwell.  Patient agrees with plan will be discharged 

home.





Dictation of this chart was performed using voice recognition software; 

therefore, there may be some unintended grammatical errors.





- Vital Signs


Vital signs: 


                                        











Temp Pulse Resp BP Pulse Ox


 


 97.6 F   62   16   115/81   96 


 


 12/22/18 02:26  12/22/18 02:26  12/22/18 02:26  12/22/18 02:26  12/22/18 02:26














Discharge





- Discharge


Clinical Impression: 


 Rash





Condition: Good


Disposition: HOME, SELF-CARE


Additional Instructions: 


Please apply a perfume free and dye free lotion to your foot such as Cera Ve. 

Apply this cream to the feet twice a day. If after 5 days you are not having 

improvement you can add the steroid cream (triamcinolone cream). Apply this 

cream daily. Return to the ER if you have worsening of your rash or it is not 

improving with the creams. Follow up with your doctor in 1-2 weeks for 

reevaluation.


Prescriptions: 


RX: Triamcinolone Acetonide [Triderm] 454 gm TP ASDIR PRN #1 cream..g.


 PRN Reason: 


Referrals: 


TRENA العلي NP [Primary Care Provider] - Follow up in 1 week

## 2020-06-05 ENCOUNTER — HOSPITAL ENCOUNTER (OUTPATIENT)
Dept: HOSPITAL 62 - RAD | Age: 60
End: 2020-06-05
Attending: NURSE PRACTITIONER
Payer: COMMERCIAL

## 2020-06-05 DIAGNOSIS — H53.19: ICD-10-CM

## 2020-06-05 DIAGNOSIS — R51: Primary | ICD-10-CM

## 2020-06-05 PROCEDURE — 70450 CT HEAD/BRAIN W/O DYE: CPT

## 2020-06-05 NOTE — RADIOLOGY REPORT (SQ)
EXAM DESCRIPTION:  CT HEAD WITHOUT



IMAGES COMPLETED DATE/TIME:  6/5/2020 1:14 pm



REASON FOR STUDY:  HEADACHE (R51), VISUAL DISTURBANCES (H53.19) R51  HEADACHE H53.19  OTHER SUBJECTIV
E VISUAL DISTURBANCES



COMPARISON:  CT of the head without contrast from 8/30/2016.



TECHNIQUE:  Axial images acquired through the brain without intravenous contrast.  Images reviewed wi
th bone, brain and subdural windows.  Images stored on PACS.

All CT scanners at this facility use dose modulation, iterative reconstruction, and/or weight based d
osing when appropriate to reduce radiation dose to as low as reasonably achievable (ALARA).

[facility]: Dose Right  [facility]: CareDose    [facility]: Dose Right    [facility]: Teradose 4D    [facility]: Smart Technologies



RADIATION DOSE:  CT Rad equipment meets quality standard of care and radiation dose reduction techniq
ues were employed. CTDIvol: 48.5 mGy. DLP: 879 mGy-cm.



LIMITATIONS:  None.



FINDINGS:  There is no acute intracranial hemorrhage, vascular territorial infarct, extra-axial fluid
 collection, mass effect or midline shift.  The gray-white matter differentiation is preserved.  Ther
e is no effacement of the cerebral sulci or basal subarachnoid cisterns.  The caliber of the ventricl
es is concordant with the degree of sulcation.

The orbits and globes are intact.  There is mild polypoid mucosal thickening of the inferior surface 
of the maxillary sinuses ; there is no paranasal sinus air-fluid level.

There is no calvarium.



IMPRESSION:  No acute intracranial abnormality.

EVIDENCE OF ACUTE STROKE: NO.



COMMENT:  Quality ID # [id number]: Final reports with documentation of one or more dose reduction techniques
 (e.g., Automated exposure control, adjustment of the mA and/or kV according to patient size, use of 
iterative reconstruction technique)



TECHNICAL DOCUMENTATION:  JOB ID:  [id number]

 2011 [employer]- All Rights Reserved



Reading location - IP/workstation name: [email or web address]

## 2020-06-11 ENCOUNTER — HOSPITAL ENCOUNTER (OUTPATIENT)
Dept: HOSPITAL 62 - RAD | Age: 60
End: 2020-06-11
Attending: OPHTHALMOLOGY
Payer: COMMERCIAL

## 2020-06-11 DIAGNOSIS — H53.2: Primary | ICD-10-CM

## 2020-06-11 DIAGNOSIS — H49.22: ICD-10-CM

## 2020-06-11 PROCEDURE — 82565 ASSAY OF CREATININE: CPT

## 2020-06-11 PROCEDURE — A9576 INJ PROHANCE MULTIPACK: HCPCS

## 2020-06-11 PROCEDURE — 70553 MRI BRAIN STEM W/O & W/DYE: CPT

## 2020-06-11 NOTE — RADIOLOGY REPORT (SQ)
EXAM DESCRIPTION:  MRI HEAD COMBO



IMAGES COMPLETED DATE/TIME:  6/11/2020 1:43 pm



REASON FOR STUDY:  H53.2 DIPLOPIA H49.22 SIXTH ABDUCENT NERVE PALSY, LEFT EYE H53.2  DIPLOPIA H49.22 
 SIXTH ABDUCENT NERVE PALSY, LEFT EYE



COMPARISON:  MRI brain 4/9/2012

CT brain 8/2/2016, 8/30/2016, 6/5/2020



TECHNIQUE:  Multiplanar imaging includes noncontrasted T1, T2, FLAIR, diffusion with ADC map and post
gadolinium contrast T1 sequences.

Additional thin section axial and coronal fat-sat T2, pre and postcontrast T1 weighted images through
 orbits.

Images stored on PACS.



CONTRAST TYPE AND DOSE:  15 mL Prohance.



RENAL FUNCTION:  Not indicated.  ACR Type II contrast agent associated with few, if any, unconfounded
 cases of NSF



LIMITATIONS:  None.



FINDINGS:  ANATOMY: No developmental anomalies. Normal vascular flow voids. Pituitary fossa demonstra
nathalie an empty sella, unchanged from 2012

CSF SPACES: Normal in size and contour. No hemorrhage.

CEREBRUM: Sulci and gyri normal in size and contour.  Age-appropriate minimal spotty increased white 
matter signal on FLAIR imaging. No evidence of hemorrhage, mass, or extraaxial fluid collection. No a
bnormal enhancement post contrast.

POSTERIOR FOSSA: No signal alteration. No hemorrhage. No edema, masses, or mass effect. Internal jono
tory canals, cerebellopontine angles, mastoids normal. No enhancing lesions. No abnormal enhancement 
post contrast.

DIFFUSION IMAGING: Negative for acute or subacute infarction.

ORBITS: Globes symmetric.  Grossly conjugate gaze.  Optic nerves, intra and extraconal fat, extraocul
ar muscles, lacrimal apparatus unremarkable.

PARANASAL SINUSES: No fluid levels. Mucosa normal.  Mastoid air cells and middle ear cavities are debby
ar.

OTHER: No other significant finding.



IMPRESSION:  ESSENTIALLY NORMAL MRI OF THE BRAIN WITHOUT AND WITH INTRAVENOUS GADOLINIUM CONTRAST.

EVIDENCE OF ACUTE STROKE: NO.



TECHNICAL DOCUMENTATION:  JOB ID:  [id number]

 2011 [employer]- All Rights Reserved



Reading location - IP/workstation name: [email or web address]